# Patient Record
Sex: MALE | Race: WHITE
[De-identification: names, ages, dates, MRNs, and addresses within clinical notes are randomized per-mention and may not be internally consistent; named-entity substitution may affect disease eponyms.]

---

## 2020-04-13 ENCOUNTER — HOSPITAL ENCOUNTER (OUTPATIENT)
Dept: HOSPITAL 63 - RAD | Age: 65
Discharge: HOME | End: 2020-04-13
Attending: FAMILY MEDICINE
Payer: COMMERCIAL

## 2020-04-13 DIAGNOSIS — J90: Primary | ICD-10-CM

## 2020-04-13 DIAGNOSIS — J98.11: ICD-10-CM

## 2020-04-13 DIAGNOSIS — I50.9: ICD-10-CM

## 2020-04-13 PROCEDURE — 71046 X-RAY EXAM CHEST 2 VIEWS: CPT

## 2020-04-18 NOTE — RAD
CHEST PA   LATERAL

 

History: Shortness of breath 

 

Comparison: None.

 

Findings: 

Frontal and lateral views of the chest were obtained. Sternal wires are 

noted. Mediastinal clips noted. The cardiomediastinal silhouette is 

normal. Pulmonary vasculature is normal. No focal infiltrate. Minimal 

basilar atelectasis along the major fissures as suggested on the lateral 

view. Small bilateral pleural effusions are present. Slight pulmonary 

vasculature. There is no acute bone abnormality. 

 

IMPRESSION: 

Slight congestive heart failure and small pleural effusions. Minimal 

atelectasis.

 

 

Electronically signed by: Chago Doshi MD (4/13/2020 3:09 PM) [id number] 18-Apr-2020 11:32